# Patient Record
Sex: FEMALE | Race: WHITE | NOT HISPANIC OR LATINO | URBAN - NONMETROPOLITAN AREA
[De-identification: names, ages, dates, MRNs, and addresses within clinical notes are randomized per-mention and may not be internally consistent; named-entity substitution may affect disease eponyms.]

---

## 2020-07-06 ENCOUNTER — IMPORTED ENCOUNTER (OUTPATIENT)
Dept: URBAN - NONMETROPOLITAN AREA CLINIC 1 | Facility: CLINIC | Age: 40
End: 2020-07-06

## 2020-07-06 PROBLEM — H52.221: Noted: 2020-07-06

## 2020-07-06 PROBLEM — H52.03: Noted: 2020-07-06

## 2020-07-06 PROCEDURE — S0620 ROUTINE OPHTHALMOLOGICAL EXA: HCPCS

## 2020-07-06 NOTE — PATIENT DISCUSSION
Compound Hyperopic Astigmatism OD/Simple Hyperopia OS -  discussed findings w/patient-  new spectacle Rx issued for PRN use-  continue to monitor yearly or prn; 's Notes: MR 7/6/2020DFE defer

## 2022-04-09 ASSESSMENT — TONOMETRY
OS_IOP_MMHG: 12
OD_IOP_MMHG: 12

## 2022-04-09 ASSESSMENT — VISUAL ACUITY
OU_SC: 20/20
OD_CC: 20/20
OS_CC: 20/20